# Patient Record
Sex: FEMALE | Race: WHITE | NOT HISPANIC OR LATINO | Employment: OTHER | URBAN - NONMETROPOLITAN AREA
[De-identification: names, ages, dates, MRNs, and addresses within clinical notes are randomized per-mention and may not be internally consistent; named-entity substitution may affect disease eponyms.]

---

## 2017-12-30 ENCOUNTER — OFFICE VISIT (OUTPATIENT)
Dept: RETAIL CLINIC | Facility: CLINIC | Age: 67
End: 2017-12-30

## 2017-12-30 VITALS
DIASTOLIC BLOOD PRESSURE: 76 MMHG | SYSTOLIC BLOOD PRESSURE: 136 MMHG | TEMPERATURE: 97.6 F | BODY MASS INDEX: 31.76 KG/M2 | HEART RATE: 78 BPM | HEIGHT: 64 IN | WEIGHT: 186 LBS | RESPIRATION RATE: 16 BRPM | OXYGEN SATURATION: 98 %

## 2017-12-30 DIAGNOSIS — B86 SCABIES: Primary | ICD-10-CM

## 2017-12-30 PROCEDURE — 99203 OFFICE O/P NEW LOW 30 MIN: CPT | Performed by: NURSE PRACTITIONER

## 2017-12-30 RX ORDER — PERMETHRIN 50 MG/G
CREAM TOPICAL ONCE
Qty: 60 G | Refills: 1 | Status: SHIPPED | OUTPATIENT
Start: 2017-12-30 | End: 2017-12-30

## 2017-12-30 RX ORDER — TRIAMCINOLONE ACETONIDE 40 MG/ML
40 INJECTION, SUSPENSION INTRA-ARTICULAR; INTRAMUSCULAR ONCE
Status: SHIPPED | OUTPATIENT
Start: 2017-12-30

## 2017-12-30 NOTE — PROGRESS NOTES
Chief Complaint   Patient presents with   • Rash     Subjective   Rhoda SANTANA is a 67 y.o. female who presents to the clinic today with complaints rash that started 3 days ago after moving into a different home. She reports the people there had animals. She has some outdoor cats.   Rash   This is a new problem. The current episode started in the past 7 days. The problem has been gradually worsening since onset. The affected locations include the abdomen, back, left buttock, right buttock, groin, right arm and right hand. The rash is characterized by pain, redness and itchiness. Associated with: new home. Pertinent negatives include no anorexia, congestion, cough, diarrhea, eye pain, facial edema, fatigue, fever, joint pain, nail changes, rhinorrhea, shortness of breath, sore throat or vomiting. Past treatments include antihistamine, anti-itch cream and topical steroids. The treatment provided mild relief. Her past medical history is significant for allergies. There is no history of asthma, eczema or varicella.         Current Outpatient Prescriptions:   •  Citalopram Hydrobromide (CELEXA PO), Take  by mouth., Disp: , Rfl:   •  HYDROCHLOROTHIAZIDE PO, Take  by mouth., Disp: , Rfl:   •  OMEPRAZOLE PO, Take  by mouth., Disp: , Rfl:   •  permethrin (ACTICIN) 5 % cream, Apply  topically 1 (One) Time for 1 dose. Apply tonight and keep on for 8-14 hours then shower off. Repeat in 1 week., Disp: 60 g, Rfl: 1  •  triamcinolone (KENALOG) 0.1 % ointment, Apply  topically 3 (Three) Times a Day., Disp: 80 g, Rfl: 0    Current Facility-Administered Medications:   •  triamcinolone acetonide (KENALOG-40) injection 40 mg, 40 mg, Intramuscular, Once, Cony Page, ETHAN    Allergies:  Review of patient's allergies indicates no known allergies.    Past Medical History:   Diagnosis Date   • Acid reflux    • Allergic    • Anxiety    • Arthritis    • Depression    • Hypertension    • Sinusitis    • Urinary tract  "infection      History reviewed. No pertinent surgical history.  Family History   Problem Relation Age of Onset   • Heart disease Mother      Social History   Substance Use Topics   • Smoking status: Never Smoker   • Smokeless tobacco: Never Used   • Alcohol use No       Review of Systems  Review of Systems   Constitutional: Negative for fatigue and fever.   HENT: Negative for congestion, rhinorrhea and sore throat.    Eyes: Negative for pain.   Respiratory: Negative for cough and shortness of breath.    Gastrointestinal: Negative for anorexia, diarrhea and vomiting.   Musculoskeletal: Negative for joint pain.   Skin: Positive for rash. Negative for nail changes.       Objective   /76 (BP Location: Left arm, Patient Position: Sitting, Cuff Size: Adult)  Pulse 78  Temp 97.6 °F (36.4 °C) (Tympanic)   Resp 16  Ht 162.6 cm (64\")  Wt 84.4 kg (186 lb)  SpO2 98%  BMI 31.93 kg/m2      Physical Exam   Constitutional: She is oriented to person, place, and time. She appears well-developed and well-nourished.   HENT:   Head: Normocephalic and atraumatic.   Eyes: Pupils are equal, round, and reactive to light.   Neck: Normal range of motion. Neck supple.   Cardiovascular: Normal rate, regular rhythm and normal heart sounds.  Exam reveals no gallop and no friction rub.    No murmur heard.  Pulmonary/Chest: Effort normal and breath sounds normal. No stridor. No respiratory distress. She has no wheezes. She has no rales. She exhibits no tenderness.   Lymphadenopathy:     She has no cervical adenopathy.   Neurological: She is alert and oriented to person, place, and time.   Skin: Skin is warm and dry.   Psychiatric: She has a normal mood and affect. Her behavior is normal.   Vitals reviewed.      Assessment/Plan     Rhoda was seen today for rash.    Diagnoses and all orders for this visit:    Scabies  -     triamcinolone acetonide (KENALOG-40) injection 40 mg; Inject 1 mL into the shoulder, thigh, or buttocks 1 (One) " Time.    Other orders  -     triamcinolone (KENALOG) 0.1 % ointment; Apply  topically 3 (Three) Times a Day.  -     permethrin (ACTICIN) 5 % cream; Apply  topically 1 (One) Time for 1 dose. Apply tonight and keep on for 8-14 hours then shower off. Repeat in 1 week.    Follow up if symptoms worsen or persist

## 2017-12-30 NOTE — PATIENT INSTRUCTIONS
Scabies, Adult  Scabies is a skin condition that happens when very small insects get under the skin (infestation). This causes a rash and severe itchiness. Scabies can spread from person to person (is contagious). If you get scabies, it is common for others in your household to get scabies too.  With proper treatment, symptoms usually go away in 2-4 weeks. Scabies usually does not cause lasting problems.  CAUSES  This condition is caused by mites (Sarcoptes scabiei, or human itch mites) that can only be seen with a microscope. The mites get into the top layer of skin and lay eggs. Scabies can spread from person to person through:  · Close contact with a person who has scabies.  · Contact with infested items, such as towels, bedding, or clothing.  RISK FACTORS  This condition is more likely to develop in:  · People who live in nursing homes and other extended-care facilities.  · People who have sexual contact with a partner who has scabies.  · Young children who attend  facilities.  · People who care for others who are at increased risk for scabies.  SYMPTOMS  Symptoms of this condition may include:  · Severe itchiness. This is often worse at night.  · A rash that includes tiny red bumps or blisters. The rash commonly occurs on the wrist, elbow, armpit, fingers, waist, groin, or buttocks. Bumps may form a line (burrow) in some areas.  · Skin irritation. This can include scaly patches or sores.  DIAGNOSIS  This condition is diagnosed with a physical exam. Your health care provider will look closely at your skin. In some cases, your health care provider may take a sample of your affected skin (skin scraping) and have it examined under a microscope.  TREATMENT  This condition may be treated with:  · Medicated cream or lotion that kills the mites. This is spread on the entire body and left on for several hours. Usually, one treatment with medicated cream or lotion is enough to kill all of the mites. In severe  cases, the treatment may be repeated.  · Medicated cream that relieves itching.  · Medicines that help to relieve itching.  · Medicines that kill the mites. This treatment is rarely used.  HOME CARE INSTRUCTIONS  Medicines  · Take or apply over-the-counter and prescription medicines as told by your health care provider.  · Apply medicated cream or lotion as told by your health care provider.  · Do not wash off the medicated cream or lotion until the necessary amount of time has passed.  Skin Care  · Avoid scratching your affected skin.  · Keep your fingernails closely trimmed to reduce injury from scratching.  · Take cool baths or apply cool washcloths to help reduce itching.  General Instructions  · Clean all items that you recently had contact with, including bedding, clothing, and furniture. Do this on the same day that your treatment starts.  ¨ Use hot water when you wash items.  ¨ Place unwashable items into closed, airtight plastic bags for at least 3 days. The mites cannot live for more than 3 days away from human skin.  ¨ Vacuum furniture and mattresses that you use.  · Make sure that other people who may have been infested are examined by a health care provider. These include members of your household and anyone who may have had contact with infested items.  · Keep all follow-up visits as told by your health care provider. This is important.  SEEK MEDICAL CARE IF:  · You have itching that does not go away after 4 weeks of treatment.  · You continue to develop new bumps or burrows.  · You have redness, swelling, or pain in your rash area after treatment.  · You have fluid, blood, or pus coming from your rash.     This information is not intended to replace advice given to you by your health care provider. Make sure you discuss any questions you have with your health care provider.     Document Released: 09/07/2016 Document Reviewed: 09/07/2016  [employer] Interactive Patient Education ©2017 [employer]    Follow up if symptoms worsen or persist